# Patient Record
Sex: FEMALE | NOT HISPANIC OR LATINO | URBAN - METROPOLITAN AREA
[De-identification: names, ages, dates, MRNs, and addresses within clinical notes are randomized per-mention and may not be internally consistent; named-entity substitution may affect disease eponyms.]

---

## 2017-06-09 ENCOUNTER — IMPORTED ENCOUNTER (OUTPATIENT)
Dept: URBAN - METROPOLITAN AREA CLINIC 1 | Facility: CLINIC | Age: 22
End: 2017-06-09

## 2017-06-09 PROBLEM — H04.123: Noted: 2017-06-09

## 2017-06-09 PROCEDURE — 92004 COMPRE OPH EXAM NEW PT 1/>: CPT

## 2017-06-09 NOTE — PATIENT DISCUSSION
1.  Dry Eyes OU -- Recommended to patient to use Artificial Tears BID OU (sample of Refresh Optive given). 2.  Family hx of RP - (brother). No signs of RP on fundus exam todayReturn for an appointment in 4-6 months 40/cc with Dr. Gisele Serrano.

## 2017-12-16 ENCOUNTER — IMPORTED ENCOUNTER (OUTPATIENT)
Dept: URBAN - METROPOLITAN AREA CLINIC 1 | Facility: CLINIC | Age: 22
End: 2017-12-16

## 2017-12-16 PROBLEM — H52.223: Noted: 2017-12-16

## 2017-12-16 PROBLEM — H52.13: Noted: 2017-12-16

## 2017-12-16 PROCEDURE — 92015 DETERMINE REFRACTIVE STATE: CPT

## 2017-12-16 PROCEDURE — 92014 COMPRE OPH EXAM EST PT 1/>: CPT

## 2017-12-16 NOTE — PATIENT DISCUSSION
1. Myopia: Rx was given for correction if indicated and requested. 2. Astigmatism OU- Rx was given for correction if indicated and requested. 3.  (Glaucoma Suspect OU (0.75/0.3): Stable IOP OU. Asymmetric cupping. Baseline w/u at next appt. Can consider neuro imaging if abnormalities noted on baseline tests. )4. Return for an appointment for a 30/OCT/HVf in 3-4 months with Dr. Bhargavi Jones.

## 2018-05-11 ENCOUNTER — IMPORTED ENCOUNTER (OUTPATIENT)
Dept: URBAN - METROPOLITAN AREA CLINIC 1 | Facility: CLINIC | Age: 23
End: 2018-05-11

## 2018-05-11 PROBLEM — H40.023: Noted: 2018-05-11

## 2018-05-11 PROBLEM — H47.011: Noted: 2018-05-11

## 2018-05-11 PROCEDURE — 92133 CPTRZD OPH DX IMG PST SGM ON: CPT

## 2018-05-11 PROCEDURE — 92014 COMPRE OPH EXAM EST PT 1/>: CPT

## 2018-05-11 PROCEDURE — 92083 EXTENDED VISUAL FIELD XM: CPT

## 2018-05-11 NOTE — PATIENT DISCUSSION
1.  Optic Atrophy OD: uncertain etiology. Pt does have a h/o Chiari Malformation. Pt had MRI of brain in past- will get results. Will order MRI of orbits (RX given.) moderate thinning superior quadrant by OCT OD. 2.  Glaucoma Suspect OU (0.75/0.3): Stable IOP OU. Asymmetric cupping. Moderate thinning by OCT OD minimal OS. MRI results of orbits pending. Patient is considered high risk. Condition was discussed with patient and patient understands. Will continue to monitor patient for any progression in condition. Patient was advised to call us with any problems questions or concerns. 3. Return for an appointment for a 10/check MRI results in 1 month with Dr. Lalo Strong.

## 2018-06-05 ENCOUNTER — IMPORTED ENCOUNTER (OUTPATIENT)
Dept: URBAN - METROPOLITAN AREA CLINIC 1 | Facility: CLINIC | Age: 23
End: 2018-06-05

## 2018-06-05 PROBLEM — H40.1111: Noted: 2018-06-05

## 2018-06-05 PROBLEM — H40.022: Noted: 2018-06-05

## 2018-06-05 PROCEDURE — 99213 OFFICE O/P EST LOW 20 MIN: CPT

## 2018-06-05 NOTE — PATIENT DISCUSSION
1.  Mild Open Angle Glaucoma OD (0.75): MRI (-). Past OCT showed superior thinning OD and past HVF showed inferior defects OD. Patient to start Travatan OD only QHS (sample given.) Patient advised to be compliant with gtts. Condition was discussed with patient and patient understands. Will continue to monitor patient for any progression in condition. Patient was advised to call us with any problems questions or concerns. 2.  Glaucoma Suspect OS (0.3): Stable IOP. Observe off drops. Patient is considered high risk. Condition was discussed with patient and patient understands. Will continue to monitor patient for any progression in condition. Patient was advised to call us with any problems questions or concerns. 3. Optic Atrophy OD: uncertain etiology. MRI (-). H/o Chiari Malformation. 3. Return for an appointment for a 10/check IOP in 1 month with Dr. Gaviota Young.

## 2018-06-05 NOTE — PATIENT DISCUSSION
Glaucoma Suspect OS: Patient is considered high risk. Condition was discussed with patient and patient understands. Will continue to monitor patient for any progression in condition. Patient was advised to call us with any problems questions or concerns.

## 2018-07-27 ENCOUNTER — IMPORTED ENCOUNTER (OUTPATIENT)
Dept: URBAN - METROPOLITAN AREA CLINIC 1 | Facility: CLINIC | Age: 23
End: 2018-07-27

## 2018-07-27 PROBLEM — H40.022: Noted: 2018-07-27

## 2018-07-27 PROBLEM — H40.1111: Noted: 2018-07-27

## 2018-07-27 PROCEDURE — 99213 OFFICE O/P EST LOW 20 MIN: CPT

## 2018-07-27 NOTE — PATIENT DISCUSSION
1.  Mild Open Angle Glaucoma OD (0.75): Good response to Travatan OD only QHS CPM. Patient advised to be compliant with gtts. Condition was discussed with patient and patient understands. Will continue to monitor patient for any progression in condition. Patient was advised to call us with any problems questions or concerns. 2.  Glaucoma Suspect OS (0.3): Stable IOP. Observe off drops. Patient is considered high risk. Condition was discussed with patient and patient understands. Will continue to monitor patient for any progression in condition. Patient was advised to call us with any problems questions or concerns. 3. Optic Atrophy OD: uncertain etiology. MRI (-). H/o Chiari Malformation. 4. Return for an appointment for 10 in 6 months with Dr. Edyta Andrea.

## 2018-10-10 ENCOUNTER — HOSPITAL ENCOUNTER (OUTPATIENT)
Dept: LAB | Age: 23
Discharge: HOME | End: 2018-10-10

## 2018-10-10 PROCEDURE — 86787 VARICELLA-ZOSTER ANTIBODY: CPT | Performed by: EMERGENCY MEDICINE

## 2018-10-10 PROCEDURE — 86706 HEP B SURFACE ANTIBODY: CPT | Performed by: EMERGENCY MEDICINE

## 2018-10-11 LAB
HBV SURFACE AB SER QL IA: NEGATIVE
HBV SURFACE AB SERPL IA-ACNC: 7.79 MIU/ML
HEP BS AB COMMENT,HBSAC: ABNORMAL
VZV IGG SER IA-ACNC: 1654 INDEX

## 2019-01-22 ENCOUNTER — IMPORTED ENCOUNTER (OUTPATIENT)
Dept: URBAN - METROPOLITAN AREA CLINIC 1 | Facility: CLINIC | Age: 24
End: 2019-01-22

## 2019-01-22 PROBLEM — H40.022: Noted: 2019-01-22

## 2019-01-22 PROBLEM — H40.1111: Noted: 2019-01-22

## 2019-01-22 PROCEDURE — 99213 OFFICE O/P EST LOW 20 MIN: CPT

## 2019-01-22 NOTE — PATIENT DISCUSSION
1.  Mild Open Angle Glaucoma OD (0.75): Increased IOP OU w/ non compliance. Patient advised to be compliant with gtts. Pt to continue Travatan Z OD QHS. Condition was discussed with patient and patient understands. Will continue to monitor patient for any progression in condition. Patient was advised to call us with any problems questions or concerns. 2.  Glaucoma Suspect OS (0.3): Stable IOP. Observe off drops. Patient is considered high risk. Condition was discussed with patient and patient understands. Will continue to monitor patient for any progression in condition. Patient was advised to call us with any problems questions or concerns. 3. H/o Optic Atrophy OD: uncertain etiology. MRI (-). H/o Chiari Malformation. 4. Return for an appointment for 30/OCT in 6 months with Dr. Lalo Strong.

## 2019-07-22 ENCOUNTER — IMPORTED ENCOUNTER (OUTPATIENT)
Dept: URBAN - METROPOLITAN AREA CLINIC 1 | Facility: CLINIC | Age: 24
End: 2019-07-22

## 2019-07-22 PROBLEM — H40.1111: Noted: 2019-07-22

## 2019-07-22 PROBLEM — H40.022: Noted: 2019-07-22

## 2019-07-22 PROCEDURE — 92133 CPTRZD OPH DX IMG PST SGM ON: CPT

## 2019-07-22 PROCEDURE — 92014 COMPRE OPH EXAM EST PT 1/>: CPT

## 2019-07-22 NOTE — PATIENT DISCUSSION
1.  Mild Open Angle Glaucoma OD (0.75): IOP stable. Patient advised to be compliant with gtts. OCT stable no progression OU. Pt to continue Travatan Z OD QHS. Condition was discussed with patient and patient understands. Will continue to monitor patient for any progression in condition. Patient was advised to call us with any problems questions or concerns. 2.  Glaucoma Suspect OS (0.3): Stable IOP. Observe off drops. OCT stable no progression OU. Patient is considered high risk. Condition was discussed with patient and patient understands. Will continue to monitor patient for any progression in condition. Patient was advised to call us with any problems questions or concerns. 3. H/o Optic Atrophy OD -- uncertain etiology. H/o Chiari Malformation w/ intermittent headaches. Has been seen by Neurology recommends no treatment. Patient defers Mrx today. Return for an appointment in 6 months for a 10 HVF with Dr. Melinda Deng. Return for an appointment in PRN for MRx (vision plan) with Dr. Melinda Deng.

## 2019-10-25 ENCOUNTER — IMPORTED ENCOUNTER (OUTPATIENT)
Dept: URBAN - METROPOLITAN AREA CLINIC 1 | Facility: CLINIC | Age: 24
End: 2019-10-25

## 2019-10-25 PROBLEM — H04.123: Noted: 2019-10-25

## 2019-10-25 PROBLEM — H40.1111: Noted: 2019-10-25

## 2019-10-25 PROBLEM — H47.011: Noted: 2019-10-25

## 2019-10-25 PROCEDURE — 92012 INTRM OPH EXAM EST PATIENT: CPT

## 2019-10-25 NOTE — PATIENT DISCUSSION
1.  Optic Atrophy OD -- uncertain etiology. H/o Chiari Malformation w/ intermittent headaches. Pt has had w/u by Neuro- their recommendation is no treatment. Discussed with patient today patient is noting slight decrease in vision secondary to established optic atrophy. F/u as scheduled. 2.  Mild Open Angle Glaucoma OD (CD 0.75): IOP stable on Travatan Z OD only. Cont Travatan Z QHS OD. 3.  Glaucoma Suspect OS (CD 0.3): Stable IOP off gtts OS. Observe off drops. 4. JAMIL OU- Begin ATs TID OU Routinely. 5.  H/o CL Wear Return for an appointment as scheduled with Dr. Darren Klein.

## 2020-01-31 ENCOUNTER — IMPORTED ENCOUNTER (OUTPATIENT)
Dept: URBAN - METROPOLITAN AREA CLINIC 1 | Facility: CLINIC | Age: 25
End: 2020-01-31

## 2020-01-31 PROBLEM — H40.022: Noted: 2020-01-31

## 2020-01-31 PROBLEM — H40.1111: Noted: 2020-01-31

## 2020-01-31 PROCEDURE — 92012 INTRM OPH EXAM EST PATIENT: CPT

## 2020-01-31 PROCEDURE — 92083 EXTENDED VISUAL FIELD XM: CPT

## 2020-01-31 NOTE — PATIENT DISCUSSION
(0.75): IOP stable. Patient advised to be compliant with gtts. OCT stable no progression OU. Pt to continue Travatan Z OD QHS. Condition was discussed with patient and patient understands. Will continue to monitor patient for any progression in condition. Patient was advised to call us with any problems questions or concerns. 2.  Glaucoma Suspect OS (0.3): Stable IOP. Observe off drops. OCT stable no progression OU. Patient is considered high risk. Condition was discussed with patient and patient understands. Will continue to monitor patient for any progression in condition. Patient was advised to call us with any problems questions or concerns. 3. H/o Optic Atrophy OD -- uncertain etiology. H/o Chiari Malformation w/ intermittent headaches. Has been seen by Neurology recommends no treatment.

## 2020-01-31 NOTE — PATIENT DISCUSSION
1.  Mild Open Angle Glaucoma OD (CD 0.75): HVF WNL. IOP stable. Patient advised to be compliant with gtts. Cont Travatan Z OD QHS. Condition was discussed with patient and patient understands. Will continue to monitor patient for any progression in condition. Patient was advised to call us with any problems questions or concerns. 2.  Glaucoma Suspect OS (CD 0.30): HVF WNL. Stable IOP. Observe off drops. Patient is considered high risk. Condition was discussed with patient and patient understands. Will continue to monitor patient for any progression in condition. Patient was advised to call us with any problems questions or concerns. 3. H/o Optic Atrophy OD -- uncertain etiology. H/o Chiari Malformation w/ intermittent headaches. Has been seen by Neurology recommends no treatment. Return for an appointment in 6 months 30/OCT with Dr. Jayna Jacobson.

## 2020-07-30 ENCOUNTER — IMPORTED ENCOUNTER (OUTPATIENT)
Dept: URBAN - METROPOLITAN AREA CLINIC 1 | Facility: CLINIC | Age: 25
End: 2020-07-30

## 2020-07-30 PROBLEM — H40.022: Noted: 2020-07-30

## 2020-07-30 PROBLEM — H40.1111: Noted: 2020-07-30

## 2020-07-30 PROCEDURE — 92014 COMPRE OPH EXAM EST PT 1/>: CPT

## 2020-07-30 PROCEDURE — 92133 CPTRZD OPH DX IMG PST SGM ON: CPT

## 2020-07-30 NOTE — PATIENT DISCUSSION
1.  Mild Open Angle Glaucoma OD (CD 0.75) OCT today showing slight progression OU. IOP stable. Patient advised to be compliant with gtts. Cont Travatan Z OD QHS. Condition was discussed with patient and patient understands. Will continue to monitor patient for any progression in condition. Patient was advised to call us with any problems questions or concerns. 2.  Glaucoma Suspect OS (CD 0.30) OCT showing slight progression OU. Stable IOP. Observe off drops. Patient is considered high risk. Condition was discussed with patient and patient understands. Will continue to monitor patient for any progression in condition. Patient was advised to call us with any problems questions or concerns. 3. H/o Optic Atrophy OD -- uncertain etiology. H/o Chiari Malformation w/ intermittent headaches. Has been seen by Neurology in past recommends no treatment. 4. Dry Eye OU -- Recommend OTC ATs BID OU increasing PRN. Sample given today. Return for an appointment in 6 months 10/HVF with Dr. Reema Rodriguez.

## 2021-01-04 ENCOUNTER — IMPORTED ENCOUNTER (OUTPATIENT)
Dept: URBAN - METROPOLITAN AREA CLINIC 1 | Facility: CLINIC | Age: 26
End: 2021-01-04

## 2021-01-04 PROBLEM — H40.1111: Noted: 2021-01-04

## 2021-01-04 PROBLEM — H40.022: Noted: 2021-01-04

## 2021-01-04 PROCEDURE — 92012 INTRM OPH EXAM EST PATIENT: CPT

## 2021-01-04 PROCEDURE — 92083 EXTENDED VISUAL FIELD XM: CPT

## 2021-01-04 NOTE — PATIENT DISCUSSION
1.  Mild Open Angle Glaucoma OD (CD 0.70) HVF WNL OU. IOP stable. Patient advised to be compliant with gtts. Cont Travatan Z OD QHS. Condition was discussed with patient and patient understands. Will continue to monitor patient for any progression in condition. Patient was advised to call us with any problems questions or concerns. 2.  Glaucoma Suspect OS (CD 0.30) HVF WNL OU. Stable IOP. Observe off drops. Patient is considered high risk. Condition was discussed with patient and patient understands. Will continue to monitor patient for any progression in condition. Patient was advised to call us with any problems questions or concerns. 3. H/o Optic Atrophy OD -- uncertain etiology. H/o Chiari Malformation w/ intermittent headaches. Has been seen by Neurology in past recommends no treatment. 4. Dry Eyes OU -- Recommend OTC ATs BID OU (sample given) increasing PRN. Return for an appointment in 6 months 30/OCT with Dr. Rehana Junior.

## 2022-01-03 ENCOUNTER — IMPORTED ENCOUNTER (OUTPATIENT)
Dept: URBAN - METROPOLITAN AREA CLINIC 1 | Facility: CLINIC | Age: 27
End: 2022-01-03

## 2022-01-03 PROBLEM — H40.022: Noted: 2022-01-03

## 2022-01-03 PROBLEM — H04.123: Noted: 2022-01-03

## 2022-01-03 PROBLEM — H40.1111: Noted: 2022-01-03

## 2022-01-03 PROCEDURE — 92133 CPTRZD OPH DX IMG PST SGM ON: CPT

## 2022-01-03 PROCEDURE — 92014 COMPRE OPH EXAM EST PT 1/>: CPT

## 2022-01-03 NOTE — PATIENT DISCUSSION
1.  Mild Open Angle Glaucoma OD -- (CD: 0.60) No progression by OCT. IOP stable. Cont Travatan Z QHS OD Only. Patient to continue with current gtt regimen. Patient advised to be compliant with gtts. Condition was discussed with patient and patient understands. Will continue to monitor patient for any progression in condition. Patient was advised to call us with any problems questions or concerns. 2.  Glaucoma Suspect OS -- (CD: 0.30) OCT WNL stable. IOP stable. Cont to observe on no gtt therapy. Patient is considered high risk. Condition was discussed with patient and patient understands. Will continue to monitor patient for any progression in condition. Patient was advised to call us with any problems questions or concerns. 3.  Dry Eyes OU -- Recommend ATs BID OU routinely. 4.  H/o Optic Atrophy OD -- uncertain etiology. H/o Chiari Malformation w/ intermittent headaches. Has been seen by Neurology in past recommends no treatment. 5. CTL wear Patient deferred MRx at today's visit. Return for an appointment in 6 months 10/VF with Dr. Brigitte Ruvalcaba.

## 2022-04-02 ASSESSMENT — TONOMETRY
OS_IOP_MMHG: 13
OD_IOP_MMHG: 13
OD_IOP_MMHG: 14
OD_IOP_MMHG: 9
OD_IOP_MMHG: 17
OS_IOP_MMHG: 16
OS_IOP_MMHG: 17
OD_IOP_MMHG: 17
OD_IOP_MMHG: 13
OD_IOP_MMHG: 14
OS_IOP_MMHG: 14
OS_IOP_MMHG: 16
OS_IOP_MMHG: 17
OS_IOP_MMHG: 16
OD_IOP_MMHG: 16
OS_IOP_MMHG: 17
OS_IOP_MMHG: 17
OD_IOP_MMHG: 17
OD_IOP_MMHG: 14
OD_IOP_MMHG: 15
OS_IOP_MMHG: 16
OS_IOP_MMHG: 17
OS_IOP_MMHG: 15
OD_IOP_MMHG: 17

## 2022-04-02 ASSESSMENT — VISUAL ACUITY
OS_SC: 20/25
OS_SC: 20/25
OD_SC: 20/30
OD_SC: 20/25
OS_CC: J1+
OS_SC: 20/20
OD_SC: 20/20
OS_SC: 20/25-1
OD_SC: 20/20
OD_SC: 20/30-2
OS_SC: 20/25
OD_SC: 20/25
OD_SC: 20/25+2
OS_SC: 20/25
OS_SC: 20/20
OD_SC: 20/20
OD_CC: J1
OS_SC: 20/30
OS_SC: 20/20
OS_SC: 20/20
OD_SC: 20/20
OS_SC: 20/20
OD_SC: 20/30
OS_SC: 20/20
OD_SC: 20/25-1
OD_SC: 20/25

## 2022-04-02 ASSESSMENT — KERATOMETRY
OD_K2POWER_DIOPTERS: 46.50
OS_K2POWER_DIOPTERS: 45.50
OS_K1POWER_DIOPTERS: 44.00
OS_AXISANGLE2_DEGREES: 068
OD_AXISANGLE2_DEGREES: 096
OD_K1POWER_DIOPTERS: 43.75

## 2022-07-05 ENCOUNTER — FOLLOW UP (OUTPATIENT)
Dept: URBAN - METROPOLITAN AREA CLINIC 1 | Facility: CLINIC | Age: 27
End: 2022-07-05

## 2022-07-05 DIAGNOSIS — H40.022: ICD-10-CM

## 2022-07-05 DIAGNOSIS — H40.1111: ICD-10-CM

## 2022-07-05 PROCEDURE — 99213 OFFICE O/P EST LOW 20 MIN: CPT

## 2022-07-05 PROCEDURE — 92083 EXTENDED VISUAL FIELD XM: CPT

## 2022-07-05 ASSESSMENT — TONOMETRY
OS_IOP_MMHG: 15
OD_IOP_MMHG: 15

## 2022-07-05 ASSESSMENT — VISUAL ACUITY
OS_CC: 20/20-1
OD_CC: 20/25-2

## 2022-07-05 NOTE — PATIENT DISCUSSION
(CD: 0.60) HVF WNL OD, stable. IOP stable. Cont Travatan Z QHS OD Only. Patient to continue with current gtt regimen. Patient advised to be compliant with gtts. Condition was discussed with patient and patient understands. Will continue to monitor patient for any progression in condition. Patient was advised to call us with any problems questions or concerns.

## 2022-07-05 NOTE — PATIENT DISCUSSION
(CD: 0.30) HVF WNL OS, stable. IOP stable. Cont to observe on no gtt therapy. Patient is considered high risk. Condition was discussed with patient and patient understands. Will continue to monitor patient for any progression in condition. Patient was advised to call us with any problems questions or concerns.

## 2023-01-12 ENCOUNTER — COMPREHENSIVE EXAM (OUTPATIENT)
Dept: URBAN - METROPOLITAN AREA CLINIC 1 | Facility: CLINIC | Age: 28
End: 2023-01-12

## 2023-01-12 DIAGNOSIS — H40.022: ICD-10-CM

## 2023-01-12 DIAGNOSIS — H40.1111: ICD-10-CM

## 2023-01-12 PROCEDURE — 92133 CPTRZD OPH DX IMG PST SGM ON: CPT

## 2023-01-12 PROCEDURE — 92014 COMPRE OPH EXAM EST PT 1/>: CPT

## 2023-01-12 ASSESSMENT — TONOMETRY
OD_IOP_MMHG: 16
OS_IOP_MMHG: 16

## 2023-01-12 ASSESSMENT — VISUAL ACUITY
OS_CC: 20/20
OD_CC: J1
OD_CC: 20/20
OS_CC: J1

## 2023-01-12 NOTE — PATIENT DISCUSSION
(CD: 0.30 OS) OCT WNL OS, stable. IOP stable. Cont to observe on no gtt therapy. Patient is considered high risk. Condition was discussed with patient and patient understands. Will continue to monitor patient for any progression in condition. Patient was advised to call us with any problems questions or concerns.

## 2023-01-12 NOTE — PATIENT DISCUSSION
(CD: 0.70 OD) No progression by OCT. IOP stable. Continue Travatan Z QHS OD Only. Patient to continue with current gtt regimen. Patient advised to be compliant with gtts. Condition was discussed with patient and patient understands. Will continue to monitor patient for any progression in condition. Patient was advised to call us with any problems questions or concerns.